# Patient Record
Sex: FEMALE | Race: WHITE
[De-identification: names, ages, dates, MRNs, and addresses within clinical notes are randomized per-mention and may not be internally consistent; named-entity substitution may affect disease eponyms.]

---

## 2018-04-23 ENCOUNTER — HOSPITAL ENCOUNTER (EMERGENCY)
Dept: HOSPITAL 74 - JERFT | Age: 48
Discharge: HOME | End: 2018-04-23
Payer: SELF-PAY

## 2018-04-23 VITALS — HEART RATE: 90 BPM | DIASTOLIC BLOOD PRESSURE: 87 MMHG | SYSTOLIC BLOOD PRESSURE: 143 MMHG | TEMPERATURE: 98.1 F

## 2018-04-23 VITALS — BODY MASS INDEX: 27.3 KG/M2
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
## 2018-04-23 DIAGNOSIS — S00.571A: Primary | ICD-10-CM
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
## 2018-04-23 PROCEDURE — 0CQ0XZZ REPAIR UPPER LIP, EXTERNAL APPROACH: ICD-10-PCS

## 2018-04-23 NOTE — PDOC
History of Present Illness





- General


Chief Complaint: Bite


Stated Complaint: LACERATION


Time Seen by Provider: 18 12:55





Past History





- Past Medical History


Allergies/Adverse Reactions: 


 Allergies











Allergy/AdvReac Type Severity Reaction Status Date / Time


 


Penicillins Allergy   Verified 18 12:36











Home Medications: 


Ambulatory Orders





Clindamycin [Cleocin -] 300 mg PO TID #21 capsule 18 


Paroxetine HCl [Paxil] 20 mg PO ASDIR 18 








COPD: No





- Suicide/Smoking/Psychosocial Hx


Smoking History: Never smoked


Information on smoking cessation initiated: No


Hx Alcohol Use: No


Drug/Substance Use Hx: No


Substance Use Type: None





*Physical Exam





- Vital Signs


 Last Vital Signs











Temp Pulse Resp BP Pulse Ox


 


 98.1 F   90   18   143/87   100 


 


 18 12:36  18 12:36  18 12:36  18 12:36  18 12:36














Procedures





- Laceration/Wound Repair


  ** Left Upper Lip


Wound Length: to 2.5 cm


Wound Explored: clean, no foreign body present


Wound's Depth, Shape: superficial


Irrigated w/ Saline: Yes


Betadine Prep: Yes


Anesthesia: 1% Lidocaine


Amount of Anesthetic (ccs): 1


Wound Repaired With: Sutures


Suture Size/Type: 6:0 (simple interrupted nylon)


Number of Sutures: 1


Layer Closure: No





*DC/Admit/Observation/Transfer


Diagnosis at time of Disposition: 


Dog bite


Qualifiers:


 Encounter type: initial encounter Qualified Code(s): W54.0XXA - Bitten by dog, 

initial encounter





Laceration of vermilion border of upper lip


Qualifiers:


 Encounter type: initial encounter Qualified Code(s): S01.511A - Laceration 

without foreign body of lip, initial encounter








- Discharge Dispostion


Disposition: HOME


Condition at time of disposition: 


Admit: No





- Prescriptions


Prescriptions: 


Clindamycin [Cleocin -] 300 mg PO TID #21 capsule





- Referrals


Referrals: 


Lenka Root MD [Primary Care Provider] - 





- Patient Instructions


Printed Discharge Instructions:  [facility] for Animal Bites


Additional Instructions: 


You got bit by your dog. Your lip was repaired with sutures.


Please return in 2 days for a wound check.


Keep the area clean and dry.


Please take the clindamycin three times a day for the next week to prevent 

infection.





Return to the ED sooner if you have worsening pain, fevers, drainage from the 

site, or any changes in your symptoms.





- Post Discharge Activity


Forms/Work/School Notes:  Back to Work

## 2022-04-12 ENCOUNTER — HOSPITAL ENCOUNTER (OUTPATIENT)
Dept: HOSPITAL 74 - JASU-SURG | Age: 52
Discharge: HOME | End: 2022-04-12
Attending: SURGERY
Payer: COMMERCIAL

## 2022-04-12 VITALS — SYSTOLIC BLOOD PRESSURE: 123 MMHG | HEART RATE: 81 BPM | DIASTOLIC BLOOD PRESSURE: 71 MMHG

## 2022-04-12 VITALS — BODY MASS INDEX: 27.4 KG/M2

## 2022-04-12 VITALS — TEMPERATURE: 97.3 F

## 2022-04-12 DIAGNOSIS — N64.52: Primary | ICD-10-CM

## 2022-04-12 PROCEDURE — 0HQXXZZ REPAIR LEFT NIPPLE, EXTERNAL APPROACH: ICD-10-PCS | Performed by: SURGERY
